# Patient Record
Sex: FEMALE | Race: WHITE
[De-identification: names, ages, dates, MRNs, and addresses within clinical notes are randomized per-mention and may not be internally consistent; named-entity substitution may affect disease eponyms.]

---

## 2018-10-21 ENCOUNTER — HOSPITAL ENCOUNTER (EMERGENCY)
Dept: HOSPITAL 62 - ER | Age: 23
LOS: 1 days | Discharge: HOME | End: 2018-10-22
Payer: OTHER GOVERNMENT

## 2018-10-21 DIAGNOSIS — O26.891: Primary | ICD-10-CM

## 2018-10-21 DIAGNOSIS — Z3A.00: ICD-10-CM

## 2018-10-21 DIAGNOSIS — R10.32: ICD-10-CM

## 2018-10-21 DIAGNOSIS — M79.605: ICD-10-CM

## 2018-10-21 LAB
APPEARANCE UR: (no result)
APTT PPP: YELLOW S
BILIRUB UR QL STRIP: NEGATIVE
GLUCOSE UR STRIP-MCNC: NEGATIVE MG/DL
KETONES UR STRIP-MCNC: (no result) MG/DL
NITRITE UR QL STRIP: NEGATIVE
PH UR STRIP: 8 [PH] (ref 5–9)
PROT UR STRIP-MCNC: NEGATIVE MG/DL
SP GR UR STRIP: 1.02
UROBILINOGEN UR-MCNC: NEGATIVE MG/DL (ref ?–2)

## 2018-10-21 PROCEDURE — 81025 URINE PREGNANCY TEST: CPT

## 2018-10-21 PROCEDURE — 96374 THER/PROPH/DIAG INJ IV PUSH: CPT

## 2018-10-21 PROCEDURE — 76817 TRANSVAGINAL US OBSTETRIC: CPT

## 2018-10-21 PROCEDURE — 81001 URINALYSIS AUTO W/SCOPE: CPT

## 2018-10-21 PROCEDURE — 99284 EMERGENCY DEPT VISIT MOD MDM: CPT

## 2018-10-21 PROCEDURE — 93976 VASCULAR STUDY: CPT

## 2018-10-21 PROCEDURE — 36415 COLL VENOUS BLD VENIPUNCTURE: CPT

## 2018-10-21 PROCEDURE — 84702 CHORIONIC GONADOTROPIN TEST: CPT

## 2018-10-21 NOTE — RADIOLOGY REPORT (SQ)
EXAM DESCRIPTION: 



US PREGNANCY TRANSVAGINAL



COMPLETED DATE/TME:  10/21/2018 22:16



CLINICAL HISTORY: 



23 years, Female, abdominal pain in pregnancy



Findings: Uterus is anteverted and measures 8.6 x 5.2 x 4.3 cm.

There is a small possible gestational sac measuring 2 mm which

may represent gestational age of five weeks. There is no fetal

pole noted. Small amount of fluid in the cervix which measures

3.2 cm in length. Maternal ovaries are within normal limits. No

abnormal adnexal masses.



IMPRESSION:



Possible early IUG of five weeks. No fetal pole identified.

Recommend follow-up beta-hCG levels.

## 2018-10-21 NOTE — ER DOCUMENT REPORT
ED General





- General


Chief Complaint: OB Problem (<20wks)


Stated Complaint: ABDOMINAL PAIN


Time Seen by Provider: 10/21/18 22:01


Notes: 


Patient is a 23-year-old female presents with complaint of some abdominal pain 

in pregnancy.  She said it started today.  Left lower quadrant.  She also has 

some pain that radiates into her leg from the left lower quadrant of her 

abdomen.  She recently found out she was pregnant.  Approximately 5 days ago 

her hCG level was 127.  This is her third pregnancy.  She has had previous C-

section with a live child.  She also had twins which resulted in a miscarriage 

and a elective .  She denies fevers.  No vaginal bleeding.  No abnormal 

vaginal discharge.  No recent trauma or injuries to the abdomen.  No other 

complaints at this time.  Patient says pain started while she was driving away 

from the store in the car.  She said she had ran into the store but the pain 

did not start until after she was sitting in her car and driving.





TRAVEL OUTSIDE OF THE [place] IN LAST 30 DAYS: No





Past Medical History





- Social History


Smoking Status: Unknown if Ever Smoked


Frequency of alcohol use: None


Drug Abuse: None


Family History: Reviewed & Not Pertinent





Review of Systems





- Review of Systems


Notes: 





My Normal Review Basic





REVIEW OF SYSTEMS:


CONSTITUTIONAL :  Denies fever,  chills, or sweats.  Denies recent illness.


RESPIRATORY:  Denies cough, cold, or chest congestion.  Denies shortness of 

breath, difficulty breathing, or wheezing.


GASTROINTESTINAL: Left lower quadrant abdominal pain..  Denies nausea, vomiting

, or diarrhea.  


GENITOURINARY:  Denies difficulty urinating, painful urination, burning, 

frequency, or blood in urine.


FEMALE  GENITOURINARY:  Denies vaginal bleeding, abnormal or irregular periods.

  LMP: Currently pregnant


MUSCULOSKELETAL: Pain radiating into left leg.


NEUROLOGICAL:  Denies sensory or motor loss.


ALL OTHER SYSTEMS REVIEWED AND NEGATIVE.





Physical Exam





- Vital signs


Vitals: 


 











Temp Pulse Resp BP Pulse Ox


 


 98.1 F   88   16   128/71 H  97 


 


 10/21/18 21:32  10/21/18 21:32  10/21/18 21:32  10/21/18 21:32  10/21/18 21:32














- Notes


Notes: 





General Appearance: Well nourished, alert, cooperative, no acute distress, mild 

obvious discomfort.


Vitals: reviewed, See vital signs table.


Eyes: PERRL, EOMI, Conjuctiva clear


Lungs: No wheezing, No rales, No rhonci, No accessory muscle use, good air 

exchange bilaterally.


Heart: Normal rate, Regular rythm, No murmur, no rub


Abdomen: Normal BS, soft, No rigidity, some pain to palpation over left lower 

quadrant of abdomen.  Remainder of abdomen is nontender.  , No guarding, no 

rebound, no abdominal masses, no organomegaly


Extremities: strength 5/5 in all extremities, good dorsalis pedis as well as 

posterior tibial pulse in the left foot.  Good capillary refill in the left 

foot.  Distal sensation intact., no swelling or tenderness in the extremities, 

no edema.


Skin: warm, dry, appropriate color, no rash


Neuro: speech clear, oriented x 3, normal affect, responds appropriately to 

questions.





Course





- Re-evaluation


Re-evalutation: 





10/22/18 05:40


Patient's ultrasound shows probable IUP.  They cannot tell for sure if there is 

IUP on present.  Patient does have left lower pain.  She is feeling some 

improved.  Urinalysis negative.  She has had no vaginal bleeding therefore 

RhoGam was not given at this time.  I informed the patient that she will need 

to return in 2 days for reevaluation and also probable repeat hCG level as well 

as repeat ultrasound being that we do not have definitive IUP as of yet.  I 

informed her if she has worsening pain or vaginal bleeding that she must return 

to ER immediately.  Patient agrees with plan and will be discharged home.





Dictation of this chart was performed using voice recognition software; 

therefore, there may be some unintended grammatical errors.





- Vital Signs


Vital signs: 


 











Temp Pulse Resp BP Pulse Ox


 


 98.3 F   72   15   94/60 L  100 


 


 10/22/18 00:22  10/22/18 00:22  10/22/18 00:22  10/22/18 00:22  10/22/18 00:22














- Laboratory


Laboratory results interpreted by me: 


 











  10/21/18 10/21/18 10/21/18





  21:41 21:41 21:41


 


Beta HCG, Quant   1333.40 H 


 


Urine Ketones    TRACE H


 


Urine HCG, Qual  POSITIVE H  














Discharge





- Discharge


Clinical Impression: 


Abdominal pain during pregnancy


Qualifiers:


 Trimester: first trimester Qualified Code(s): O26.891 - Other specified 

pregnancy related conditions, first trimester





Condition: Good


Disposition: HOME, SELF-CARE


Additional Instructions: 


Please take tylenol 650mg every 4 hours for pain. As discussed with you, the 

radiologist cannot 100% see a pregnancy as of yet, but sees some evidence of a 

developing pregnancy. Because of your pain we want you to return to the ER in 2 

days for recheck of your pregnancy hormone level and repeat ultrasound. please 

return to the ER immediately if you have sudden worsening pain, fevers, vaginal 

bleeding, leg weakness, or feel that you are worsening in any way. Please avoid 

heavy lifting and sexual activity.

## 2018-10-22 VITALS — SYSTOLIC BLOOD PRESSURE: 94 MMHG | DIASTOLIC BLOOD PRESSURE: 60 MMHG

## 2018-10-23 ENCOUNTER — HOSPITAL ENCOUNTER (EMERGENCY)
Dept: HOSPITAL 62 - ER | Age: 23
Discharge: HOME | End: 2018-10-23
Payer: OTHER GOVERNMENT

## 2018-10-23 VITALS — DIASTOLIC BLOOD PRESSURE: 81 MMHG | SYSTOLIC BLOOD PRESSURE: 125 MMHG

## 2018-10-23 DIAGNOSIS — M79.606: ICD-10-CM

## 2018-10-23 DIAGNOSIS — R10.2: ICD-10-CM

## 2018-10-23 DIAGNOSIS — Z88.8: ICD-10-CM

## 2018-10-23 DIAGNOSIS — Z3A.01: ICD-10-CM

## 2018-10-23 DIAGNOSIS — O99.89: ICD-10-CM

## 2018-10-23 DIAGNOSIS — O26.891: Primary | ICD-10-CM

## 2018-10-23 PROCEDURE — 99284 EMERGENCY DEPT VISIT MOD MDM: CPT

## 2018-10-23 PROCEDURE — 36415 COLL VENOUS BLD VENIPUNCTURE: CPT

## 2018-10-23 PROCEDURE — 84702 CHORIONIC GONADOTROPIN TEST: CPT

## 2018-10-23 NOTE — ER DOCUMENT REPORT
ED General





- General


Chief Complaint: OB Problem (<20wks)


Stated Complaint: ABDOMINAL PAIN


Time Seen by Provider: 10/23/18 12:10


Notes: 





Patient is a 23-year-old female, that is approximately 5 weeks gravid that 

presents to the emergency department for chief complaint of bilateral pelvic 

cramping.  Patient was seen 2 days ago for similar complaints, advised to 

return in 2 days to, be reevaluated.  She still having the bilateral cramping, 

describes it as an aching sensation, occasionally sharp, worse with cough.  She 

has had pain in her leg as well, that seems to be worse with coughing or 

sneezing, this started after she ran to the grocery store.  She denies any 

falls or injuries.  Denies any numbness, to or weakness.  Denies any dysuria or 

hematuria.





Past Medical History: Denies chronic medical conditions


Past Surgical History: 


Social History: Denies tobacco, alcohol or drug use


Family History: Reviewed and noncontributory for presenting illness


Allergies: Reviewed, see documented allergy list. 





REVIEW OF SYSTEMS:


Unless otherwise stated in this report the patient's positive and negative 

responses for review of systems for constitutional, eyes, ENT, cardiovascular, 

respiratory, gastrointestinal, neurological, genitourinary, musculoskeletal, 

and integumentary systems and related systems to the presenting problem are 

either as stated in the HPI or were not pertinent or were negative for the 

symptoms and/or complaints related to the presenting medical problem.





PHYSICAL EXAMINATION:





Vital signs reviewed, nursing noted reviewed. 





GENERAL: Well-appearing, well-nourished and in no acute distress.





HEAD: Atraumatic, normocephalic.





EYES: Eyes appear normal, extraocular movements intact, sclera anicteric, 

conjunctiva are normal.





ENT: nares patent, oropharynx clear without exudates.  Moist mucous membranes.





NECK: Normal range of motion, supple without lymphadenopathy





LUNGS: Breath sounds clear to auscultation bilaterally and equal.  No wheezes 

rales or rhonchi.





HEART: Regular rate and rhythm without murmurs





ABDOMEN: Soft, nontender, normoactive bowel sounds.  No rebound, guarding, or 

rigidity. No masses appreciated.





EXTREMITIES: Nontender, good range of motion, no pitting or edema.  





NEUROLOGICAL: No focal neurological deficits. Moves all extremities 

spontaneously Motor and sensory grossly intact on exam.





PSYCH: Normal mood, normal affect.





SKIN: Warm, Dry, normal turgor, no rashes or lesions noted on exposed skin





TRAVEL OUTSIDE OF THE [place] IN LAST 30 DAYS: No





- Related Data


Allergies/Adverse Reactions: 


 





benzonatate [From Tesmax Carrion] Allergy (Verified 10/23/18 11:47)


 


NSAIDS (Non-Steroidal Anti-Inflamma Allergy (Verified 10/23/18 11:47)


 











Past Medical History





- General


Last Menstrual Period: 9/15/18





- Social History


Smoking Status: Unknown if Ever Smoked


Chew tobacco use (# tins/day): No


Frequency of alcohol use: None


Drug Abuse: None


Family History: Reviewed & Not Pertinent


Patient has suicidal ideation: No


Patient has homicidal ideation: No


Renal/ Medical History: Denies: Hx Peritoneal Dialysis


Past Surgical History: Reports: Hx  Section





Physical Exam





- Vital signs


Vitals: 


 











Temp Pulse Resp BP Pulse Ox


 


 98 F   84   16   116/70   100 


 


 10/23/18 11:51  10/23/18 11:51  10/23/18 11:51  10/23/18 11:51  10/23/18 11:51














Course





- Re-evaluation


Re-evalutation: 





Patient seen and examined vital signs reviewed. 





Laboratory data and imaging were ordered as appropriate for the patient's 

presenting symptoms and complaint, with consideration of any critical or life 

threatening conditions that may be associated with their obtained history and 

exam as noted above.





Results were reviewed when available and demonstrated hCG that doubled from 2 

days ago, consistent with advancing pregnancy, patient was advised to repeat an 

ultrasound in several weeks, and to follow-up with women's health, and to take 

Tylenol as needed for her pain.





The patient was re-evaluated and was stable, no acute distress or pain





Evaluation was most consistent with early pregnancy, abdominal cramping, and 

progressing pregnancy, prior ultrasound was reviewed, demonstrated early IUP.





Results were discussed with the patient at this point, after careful 

consideration I feel that that patient can be discharged from the emergency 

department, the patient was educated treatments and reasons to return to the 

emergency department based on their presumed diagnosis as noted above, they 

were advised to followup with a primary care physician in 2-3 days. Patient was 

agreeable to plan of care.





*Note is created using voice recognition software and may contain spelling, 

syntax or grammatical errors.








Laboratory











  10/23/18





  12:30


 


Beta HCG, Quant  2635.60 H


 


Total Beta HCG  POSITIVE

















- Vital Signs


Vital signs: 


 











Temp Pulse Resp BP Pulse Ox


 


 98 F   84   16   116/70   100 


 


 10/23/18 11:51  10/23/18 11:51  10/23/18 11:51  10/23/18 11:51  10/23/18 11:51














- Laboratory


Laboratory results interpreted by me: 


 











  10/23/18





  12:30


 


Beta HCG, Quant  2635.60 H














Discharge





- Discharge


Clinical Impression: 


Pregnancy


Qualifiers:


 Weeks of gestation: less than 8 weeks Qualified Code(s): Z3A.01 - Less than 8 

weeks gestation of pregnancy





Condition: Stable


Disposition: HOME, SELF-CARE


Instructions:  Pregnancy ([facility]), Pelvic Pain in Pregnancy ([facility])


Referrals: 


JUAN J MORENO PA-C [Primary Care Provider] - Follow up in 3-5 days


[facility] [Provider Group] - Follow up tomorrow

## 2018-10-30 ENCOUNTER — HOSPITAL ENCOUNTER (EMERGENCY)
Dept: HOSPITAL 62 - ER | Age: 23
LOS: 1 days | Discharge: HOME | End: 2018-10-31
Payer: OTHER GOVERNMENT

## 2018-10-30 ENCOUNTER — HOSPITAL ENCOUNTER (EMERGENCY)
Dept: HOSPITAL 62 - ER | Age: 23
Discharge: HOME | End: 2018-10-30
Payer: OTHER GOVERNMENT

## 2018-10-30 VITALS — SYSTOLIC BLOOD PRESSURE: 107 MMHG | DIASTOLIC BLOOD PRESSURE: 69 MMHG

## 2018-10-30 DIAGNOSIS — O26.891: ICD-10-CM

## 2018-10-30 DIAGNOSIS — R10.9: ICD-10-CM

## 2018-10-30 DIAGNOSIS — Z3A.00: ICD-10-CM

## 2018-10-30 DIAGNOSIS — Z3A.01: ICD-10-CM

## 2018-10-30 DIAGNOSIS — O46.91: Primary | ICD-10-CM

## 2018-10-30 DIAGNOSIS — O20.0: Primary | ICD-10-CM

## 2018-10-30 PROCEDURE — 96374 THER/PROPH/DIAG INJ IV PUSH: CPT

## 2018-10-30 PROCEDURE — 36415 COLL VENOUS BLD VENIPUNCTURE: CPT

## 2018-10-30 PROCEDURE — 96375 TX/PRO/DX INJ NEW DRUG ADDON: CPT

## 2018-10-30 PROCEDURE — S0119 ONDANSETRON 4 MG: HCPCS

## 2018-10-30 PROCEDURE — 99284 EMERGENCY DEPT VISIT MOD MDM: CPT

## 2018-10-30 PROCEDURE — 96361 HYDRATE IV INFUSION ADD-ON: CPT

## 2018-10-30 PROCEDURE — 84702 CHORIONIC GONADOTROPIN TEST: CPT

## 2018-10-30 PROCEDURE — 93976 VASCULAR STUDY: CPT

## 2018-10-30 PROCEDURE — 76817 TRANSVAGINAL US OBSTETRIC: CPT

## 2018-10-30 PROCEDURE — 86900 BLOOD TYPING SEROLOGIC ABO: CPT

## 2018-10-30 PROCEDURE — 86901 BLOOD TYPING SEROLOGIC RH(D): CPT

## 2018-10-30 PROCEDURE — 86850 RBC ANTIBODY SCREEN: CPT

## 2018-10-30 PROCEDURE — 85025 COMPLETE CBC W/AUTO DIFF WBC: CPT

## 2018-10-30 PROCEDURE — 96372 THER/PROPH/DIAG INJ SC/IM: CPT

## 2018-10-30 NOTE — RADIOLOGY REPORT (SQ)
EXAM DESCRIPTION:  U/S OB TRANSVAG W/DOPPLER



COMPLETED DATE/TIME:  10/30/2018 5:01 pm



REASON FOR STUDY:  6 weeks pregnant, started bleeding since this morn



COMPARISON:  10/21/2018



TECHNIQUE:  Transvaginal static and realtime grayscale images acquired of the pelvis. Additional Vic
cted spectral and color Doppler images recorded. All images stored on PACs.

CLINICAL AGE: 6 weeks 3 days

[id number]



LIMITATIONS:  None.



FINDINGS:  UTERUS: No masses.  No anomalies.

GESTATIONAL SAC: Visualized

YOLK SAC: Visualized

FETAL POLE: Fetal pole is visualized.  No cardiac activity is identified.

RIGHT ADNEXA: Normal ovary with normal vascular flow.

No adnexal free fluid.

Small cyst is identified measuring 2.3 x 1.7 x 1.0 cm most consistent with a corpus lutein cyst

LEFT ADNEXA: Normal ovary with normal vascular flow.

No adnexal free fluid.

No adnexal masses.

FREE FLUID: Not visualized

OTHER: Small subchorionic bleed is identified.



IMPRESSION:  Fetal pole is identified without cardiac activity being identified.

CONSIDER F/U BHCG AND/OR ULTRASOUND FOR VERIFICATION of a living IUP.

Trimester of pregnancy:  First - 0 to 13 weeks.



TECHNICAL DOCUMENTATION:  JOB ID:  [id number]

  [employer]- All Rights Reserved



Reading location - IP/workstation name: [email or web address]

## 2018-10-30 NOTE — ER DOCUMENT REPORT
ED GI/





- General


Chief Complaint: OB Problem (<20wks)


Stated Complaint: VAGINAL BLEEDING


Time Seen by Provider: 10/30/18 15:40


Mode of Arrival: Ambulatory


Information source: Patient


Notes: 





Chief complaint: Vaginal bleeding








History of complain:( obtained from----patient) 23 years old female, 6 weeks 

pregnant, was having abdominal cramps since this morning and had a episode of 

vaginal bleeding.  Therefore present to the ED.  Nauseous but no vomiting.  

Denies any dizziness.  Denies any other constitutional symptoms





She is a , 1 








Onset: Today


Duration: One day


Severity: Mild to moderate


Quality: Crampy


Context: Pregnancy


Exacerbating factor and relieving factors: None











REVIEW OF SYSTEMS:


CONSTITUTIONAL :  Denies fever,  chills, or sweats.  Denies recent illness.


EENT:   Denies eye, ear, throat, or mouth pain or symptoms.  Denies nasal or 

sinus congestion or discharge.  Denies throat, tongue, or mouth swelling or 

difficulty swallowing.


CARDIOVASCULAR:  Denies chest pain.  Denies palpitations or racing or irregular 

heart beat.  Denies ankle edema.


RESPIRATORY:  Denies cough, cold, or chest congestion.  Denies shortness of 

breath, difficulty breathing, or wheezing.


GASTROINTESTINAL:  Denies  distention.  Denies nausea, vomiting, or diarrhea.  

Denies blood in vomitus, stools, or per rectum.  Denies black, tarry stools.  

Denies constipation. 


GENITOURINARY:  Denies difficulty urinating, painful urination, burning, 

frequency, blood in urine, or discharge.


FEMALE  GENITOURINARY:  Denies vaginal bleeding, heavy or abnormal periods, 

irregular periods.  Denies vaginal discharge or odor. 


MUSCULOSKELETAL:  Denies back or neck pain or stiffness.  Denies joint pain or 

swelling.


SKIN:   Denies rash, lesions or sores.


HEMATOLOGIC :   Denies easy bruising or bleeding.


LYMPHATIC:  Denies swollen, enlarged glands.


NEUROLOGICAL:  Denies confusion or altered mental status.  Denies passing out 

or loss of consciousness.  Denies dizziness or lightheadedness.  Denies 

headache.  Denies weakness or paralysis or loss of use of either side.  Denies 

problems with gait or speech.  Denies sensory loss, numbness, or tingling.  

Denies seizures.


PSYCHIATRIC:  Denies anxiety or stress.  Denies depression, suicidal ideation, 

or homicidal ideation.





ALL OTHER SYSTEMS REVIEWED AND NEGATIVE.











PHYSICAL EXAMINATION:





GENERAL: Well-appearing, well-nourished and in no acute distress.





HEAD: Atraumatic, normocephalic.





EYES: Pupils equal round and reactive to light, extraocular movements intact, 

conjunctiva are normal.





ENT: Nares patent, oropharynx clear without exudates.  Moist mucous membranes.





NECK: Normal range of motion, supple without lymphadenopathy





LUNGS: Breath sounds clear to auscultation bilaterally and equal.  No wheezes 

rales or rhonchi.





HEART: Regular rate and rhythm without murmurs





ABDOMEN: Soft, nontender, nondistended abdomen.  No guarding, no rebound.  No 

masses appreciated.





Examination of genitals-deferred





Musculoskeletal: Normal range of motion, no pitting or edema.  No cyanosis.





NEUROLOGICAL: Cranial nerves grossly intact.  Normal speech, normal gait.  

Normal sensory, motor exams





PSYCH: Normal mood, normal affect.





SKIN: Warm, Dry, normal turgor, no rashes or lesions noted.

















Dictation was performed using Dragon voice recognition software


TRAVEL OUTSIDE OF THE [place] IN LAST 30 DAYS: No





- HPI


Notes: 





10/30/18 15:42





Dictated





- Related Data


Allergies/Adverse Reactions: 


 





benzonatate [From Tessalon Perles] Allergy (Verified 10/30/18 15:26)


 


NSAIDS (Non-Steroidal Anti-Inflamma Allergy (Verified 10/30/18 15:26)


 











Past Medical History





- Social History


Smoking Status: Never Smoker


Chew tobacco use (# tins/day): No


Frequency of alcohol use: None


Drug Abuse: None


Lives with: Family


Family History: Reviewed & Not Pertinent


Patient has suicidal ideation: No


Patient has homicidal ideation: No


Renal/ Medical History: Denies: Hx Peritoneal Dialysis


Past Surgical History: Reports: Hx  Section





Review of Systems





- Review of Systems


Notes: 








Dictated





Physical Exam





- Vital signs


Vitals: 


 











Temp Pulse Resp BP Pulse Ox


 


 98.4 F   98   20   118/62   97 


 


 10/30/18 15:30  10/30/18 15:30  10/30/18 15:30  10/30/18 15:30  10/30/18 15:30














- Notes


Notes: 








Dictated





Course





- Vital Signs


Vital signs: 


 











Temp Pulse Resp BP Pulse Ox


 


 98.4 F   98   20   118/62   97 


 


 10/30/18 15:30  10/30/18 15:30  10/30/18 15:30  10/30/18 15:30  10/30/18 15:30














- Laboratory


Laboratory results interpreted by me: 


 











  10/30/18





  15:49


 


Beta HCG, Quant  85621.00 H














- Diagnostic Test


Radiology reviewed: Reports reviewed - Ultrasound was reported by radiologist 

as fetal pole is visualized but no cardiac activity.





Discharge





- Discharge


Clinical Impression: 


 Threatened  affecting intrauterine pregnancy





Condition: Fair


Instructions:  Threatened Miscarriage ([facility])


Referrals: 


JUAN J MORENO PA-C [Primary Care Provider] - Follow up as needed

## 2018-10-31 VITALS — DIASTOLIC BLOOD PRESSURE: 69 MMHG | SYSTOLIC BLOOD PRESSURE: 110 MMHG

## 2018-10-31 LAB
ADD MANUAL DIFF: NO
BASOPHILS # BLD AUTO: 0.1 10^3/UL (ref 0–0.2)
BASOPHILS NFR BLD AUTO: 1 % (ref 0–2)
EOSINOPHIL # BLD AUTO: 0.2 10^3/UL (ref 0–0.6)
EOSINOPHIL NFR BLD AUTO: 2 % (ref 0–6)
ERYTHROCYTE [DISTWIDTH] IN BLOOD BY AUTOMATED COUNT: 12.9 % (ref 11.5–14)
HCT VFR BLD CALC: 37.5 % (ref 36–47)
HGB BLD-MCNC: 12.9 G/DL (ref 12–15.5)
LYMPHOCYTES # BLD AUTO: 2.7 10^3/UL (ref 0.5–4.7)
LYMPHOCYTES NFR BLD AUTO: 26.2 % (ref 13–45)
MCH RBC QN AUTO: 29.7 PG (ref 27–33.4)
MCHC RBC AUTO-ENTMCNC: 34.4 G/DL (ref 32–36)
MCV RBC AUTO: 86 FL (ref 80–97)
MONOCYTES # BLD AUTO: 0.8 10^3/UL (ref 0.1–1.4)
MONOCYTES NFR BLD AUTO: 8.2 % (ref 3–13)
NEUTROPHILS # BLD AUTO: 6.4 10^3/UL (ref 1.7–8.2)
NEUTS SEG NFR BLD AUTO: 62.6 % (ref 42–78)
PLATELET # BLD: 259 10^3/UL (ref 150–450)
RBC # BLD AUTO: 4.35 10^6/UL (ref 3.72–5.28)
TOTAL CELLS COUNTED % (AUTO): 100 %
WBC # BLD AUTO: 10.2 10^3/UL (ref 4–10.5)

## 2018-10-31 NOTE — ER DOCUMENT REPORT
ED GI/





- General


Chief Complaint: vaginal bleding


Stated Complaint: ABDOMINAL CRAMPING/VAGINAL BLEEDING


Time Seen by Provider: 10/31/18 00:05


Notes: 


Patient is a 23-year-old female that comes to the emergency department for 

chief complaint of vaginal bleeding and lower abdominal cramping.  She states 

she is estimated to be 6 weeks pregnant by last menstrual period, she was seen 

here earlier today and had an ultrasound that showed a fetal pole but no 

cardiac activity.  She states that since she went home her bleeding has 

increased and her cramping has increased.  She has not passed any tissue.  She 

is  with one emergency  and one elective .  Only 

medication is prenatal vitamins.





TRAVEL OUTSIDE OF THE [place] IN LAST 30 DAYS: No





- Related Data


Allergies/Adverse Reactions: 


 





benzonatate [From TesOctapoly] Allergy (Verified 10/30/18 15:26)


 


NSAIDS (Non-Steroidal Anti-Inflamma Allergy (Verified 10/30/18 15:26)


 











Past Medical History





- General


Information source: Patient





- Social History


Smoking Status: Never Smoker


Frequency of alcohol use: None


Drug Abuse: None


Lives with: Family


Family History: Reviewed & Not Pertinent


Patient has suicidal ideation: No


Patient has homicidal ideation: No





- Medical History


Medical History: Negative


Renal/ Medical History: Denies: Hx Peritoneal Dialysis


Past Surgical History: Reports: Hx  Section





- Immunizations


Immunizations up to date: Yes


Hx Diphtheria, Pertussis, Tetanus Vaccination: Yes





Review of Systems





- Review of Systems


Constitutional: No symptoms reported


EENT: No symptoms reported


Cardiovascular: No symptoms reported


Respiratory: No symptoms reported


Gastrointestinal: See HPI


Genitourinary: No symptoms reported


Female Genitourinary: See HPI


Musculoskeletal: No symptoms reported


Skin: No symptoms reported


Hematologic/Lymphatic: No symptoms reported


Neurological/Psychological: No symptoms reported





Physical Exam





- Vital signs


Vitals: 


 











Temp Pulse Resp BP Pulse Ox


 


 98 F   71   18   117/56 L  99 


 


 10/30/18 23:35  10/30/18 23:35  10/30/18 23:35  10/30/18 23:35  10/30/18 23:35














- Notes


Notes: 





GENERAL: Alert, interacts well. No acute distress.


HEAD: Normocephalic, atraumatic.


EYES: Pupils equal, round, and reactive to light. Extraocular movements intact.


ENT: Oral mucosa moist, tongue midline. Oropharynx unremarkable. Airway patent. 

Nares patent, no nasal septal hematoma, TM's intact.


NECK: Full range of motion. Supple. Trachea midline.


LUNGS: Clear to auscultation bilaterally, no wheezes, rales, or rhonchi. No 

respiratory distress.


HEART: Regular rate and rhythm. No murmur


ABDOMEN: Soft, non-tender. Non-distended. Bowel sounds present in all 4 

quadrants.


GENITOURINARY: Deferred


EXTREMITIES: Moves all 4 extremities spontaneously. No edema, normal radial and 

dorsalis pedis pulses bilaterally. No cyanosis.


BACK: no cervical, thoracic, lumbar midline tenderness. No saddle anesthesia, 

normal distal neurovascular exam. 


NEUROLOGICAL: Alert and oriented x3. Normal speech. [cranial nerves II through 

XII grossly intact]. 


PSYCH: Normal affect, normal mood.


SKIN: Warm, dry, normal turgor. No rashes or lesions noted.





Course





- Re-evaluation


Re-evalutation: 


Well-appearing patient.  Nontender abdomen.  No current severe bleeding.  

Unremarkable vital signs without hypotension.





Cough without anemia.  HCG is actually elevating, previous was 30,000, now 36,

000.  Not consistent with miscarriage.  Patient had an ultrasound within the 

past 12 hours that showed intrauterine pregnancy although this did not show 

definite heartbeat.  Patient was discussed with Dr. Spain.  No additional 

recommendations at this time.  Patient was given RhoGam because this was 

indicated, discussed precautions, follow-up, and return precautions.  Patient 

states satisfaction and agreement with plan.





- Vital Signs


Vital signs: 


 











Temp Pulse Resp BP Pulse Ox


 


 98.2 F   73   18   110/69   100 


 


 10/31/18 02:18  10/31/18 02:18  10/31/18 02:18  10/31/18 02:18  10/31/18 02:18














- Laboratory


Result Diagrams: 


 10/31/18 00:29





Laboratory results interpreted by me: 


 











  10/31/18





  00:29


 


Beta HCG, Quant  25935.00 H














Discharge





- Discharge


Clinical Impression: 


 Vaginal bleeding affecting early pregnancy





Condition: Stable


Disposition: HOME, SELF-CARE


Additional Instructions: 


Your ultrasound earlier showed a pregnancy in the uterus, your pregnancy 

hormone is still elevating.


Your current situation will require close follow-up.  Please follow-up closely 

with OB/GYN for additional monitoring.  Either follow your appointment or call 

the listed referral as directed.  Perform pelvic rest (no running, jumping, 

heavy lifting, sexual intercourse, etc.).





Return for any concerning symptoms including heavy bleeding with dizziness, 

passing out, severe pain, or any other concerning symptoms.


Referrals: 


[facility] [Provider Group] - 18

## 2019-01-26 ENCOUNTER — HOSPITAL ENCOUNTER (EMERGENCY)
Dept: HOSPITAL 62 - ER | Age: 24
Discharge: HOME | End: 2019-01-26
Payer: COMMERCIAL

## 2019-01-26 VITALS — DIASTOLIC BLOOD PRESSURE: 68 MMHG | SYSTOLIC BLOOD PRESSURE: 119 MMHG

## 2019-01-26 DIAGNOSIS — M25.531: ICD-10-CM

## 2019-01-26 DIAGNOSIS — S63.501A: ICD-10-CM
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
## 2019-01-26 DIAGNOSIS — R07.81: ICD-10-CM

## 2019-01-26 DIAGNOSIS — S16.1XXA: Primary | ICD-10-CM

## 2019-01-26 DIAGNOSIS — M54.2: ICD-10-CM

## 2019-01-26 PROCEDURE — 71101 X-RAY EXAM UNILAT RIBS/CHEST: CPT

## 2019-01-26 PROCEDURE — 72125 CT NECK SPINE W/O DYE: CPT

## 2019-01-26 PROCEDURE — L3908 WHO COCK-UP NONMOLDE PRE OTS: HCPCS

## 2019-01-26 PROCEDURE — 73110 X-RAY EXAM OF WRIST: CPT

## 2019-01-26 PROCEDURE — 99284 EMERGENCY DEPT VISIT MOD MDM: CPT
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
## 2019-01-29 ENCOUNTER — HOSPITAL ENCOUNTER (EMERGENCY)
Dept: HOSPITAL 62 - ER | Age: 24
Discharge: LEFT BEFORE DISCHARGE | End: 2019-01-29
Payer: COMMERCIAL

## 2019-01-29 VITALS — DIASTOLIC BLOOD PRESSURE: 64 MMHG | SYSTOLIC BLOOD PRESSURE: 111 MMHG
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
## 2019-01-29 DIAGNOSIS — Z53.21: Primary | ICD-10-CM

## 2019-06-02 ENCOUNTER — HOSPITAL ENCOUNTER (EMERGENCY)
Dept: HOSPITAL 62 - ER | Age: 24
Discharge: HOME | End: 2019-06-02
Payer: OTHER GOVERNMENT

## 2019-06-02 VITALS — SYSTOLIC BLOOD PRESSURE: 105 MMHG | DIASTOLIC BLOOD PRESSURE: 67 MMHG

## 2019-06-02 DIAGNOSIS — M79.645: ICD-10-CM
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
## 2019-06-02 DIAGNOSIS — S69.92XA: Primary | ICD-10-CM

## 2019-06-02 PROCEDURE — 99283 EMERGENCY DEPT VISIT LOW MDM: CPT

## 2019-06-02 NOTE — RADIOLOGY REPORT (SQ)
EXAM DESCRIPTION:  FINGER LEFT



COMPLETED DATE/TIME:  6/2/2019 5:39 pm



REASON FOR STUDY:  finger injury, pain



COMPARISON:  None.



NUMBER OF VIEWS:  Three views.



TECHNIQUE:  AP, lateral, and oblique images acquired of the left second finger.



LIMITATIONS:  None.



FINDINGS:  MINERALIZATION: Normal.

BONES: No acute fracture or dislocation.  No worrisome bone lesions.

SOFT TISSUES: Mild soft tissue swelling.  No foreign body.

OTHER: No other significant finding.



IMPRESSION:  No fracture.



TECHNICAL DOCUMENTATION:  JOB ID:  [id number]

 2011 [employer]- All Rights Reserved



Reading location - IP/workstation name: [email or web address]

## 2019-06-02 NOTE — ER DOCUMENT REPORT
HPI





- HPI


Patient complains to provider of: finger pain


Time Seen by Provider: 19 17:19


Onset: Just prior to arrival


Onset/Duration: Sudden


Severity: Severe


Pain Level: 4


Context: 





Patient presents emergency department with complaints of left index finger pain.

 Patient reports she dropped a 4 pack can of corn on her finger prior to 

arrival.  Reports the area really does not hurt is more numb than anything.  

Patient is able to flex and extend her finger.  Good cap refill.  Patient is 

right-hand dominant


Associated Symptoms: None


Exacerbated by: Denies


Relieved by: Denies


Similar symptoms previously: No


Recently seen / treated by doctor: No





- REPRODUCTIVE


Reproductive: DENIES: Pregnant:





Past Medical History





- General


Information source: Patient


Last Menstrual Period: 19





- Social History


Smoking Status: Unknown if Ever Smoked


Cigarette use (# per day): No


Frequency of alcohol use: Occasional


Drug Abuse: None


Lives with: Family


Family History: Reviewed & Not Pertinent


Patient has suicidal ideation: No


Patient has homicidal ideation: No


Renal/ Medical History: Denies: Hx Peritoneal Dialysis


GI Medical History: Reports: Other - Celiac


Past Surgical History: Reports: Hx  Section





- Immunizations


Immunizations up to date: Yes


Hx Diphtheria, Pertussis, Tetanus Vaccination: Yes





Vertical Provider Document





- CONSTITUTIONAL


Agree With Documented VS: Yes


Exam Limitations: No Limitations


General Appearance: WD/WN, No Apparent Distress





- INFECTION CONTROL


TRAVEL OUTSIDE OF THE [place] IN LAST 30 DAYS: No





- HEENT


HEENT: Atraumatic, Normocephalic





- NECK


Neck: Supple





- RESPIRATORY


Respiratory: No Respiratory Distress





- CARDIOVASCULAR


Cardiovascular: Regular Rate





- MUSCULOSKELETAL/EXTREMETIES


Musculoskeletal/Extremeties: MAEW, FROM, Tender - Left index finger tender to 

palpation proximal phalange with some ecchymosis, no obvious deformity, good cap

refill, patient able to make a strong 





- NEURO


Level of Consciousness: Awake, Alert, Appropriate


Motor/Sensory: No Motor Deficit





- DERM


Integumentary: Warm, Dry





Course





- Re-evaluation


Re-evalutation: 





19 18:03


Negative x-ray no acute fracture.  Patient placed in finger splint for 

protection and comfort.  Instructed to take Tylenol for pain ice elevate finger.

 She verbalized understanding all instructions.











Dictation of this chart was performed using voice recognition software; 

therefore, there may be some unintended grammatical errors.





- Vital Signs


Vital signs: 


                                        











Temp Pulse Resp BP Pulse Ox


 


 98.5 F   69   18   125/64   100 


 


 19 16:40  19 16:40  19 16:40  19 16:40  19 16:40














- Diagnostic Test


Radiology reviewed: Image reviewed, Reports reviewed - negative for acute 

fracture





Procedures





- Immobilization


  ** Left 2nd digit


Pre-Proc Neuro Vasc Exam: Normal


Immobilizer type: Finger splint (Static)


Performed by: PCT


Post-Proc Neuro Vasc Exam: Unchanged from pre-exam


Alignment checked and good: Yes





Discharge





- Discharge


Clinical Impression: 


Injury of left index finger


Qualifiers:


 Encounter type: initial encounter Qualified Code(s): S69.92XA - Unspecified 

injury of left wrist, hand and finger(s), initial encounter





Condition: Stable


Disposition: HOME, SELF-CARE


Instructions:  Acetaminophen, Ice & Elevation ([facility]), Temporary Splint ([facility])


Additional Instructions: 


*You have been evaluated for a finger injury


*The x-ray was negative for an acute injury


*Rest/Ice/Elevate your finger, maintain the splint for comfort


*Follow up with orthopedics for continued pain within 1 week for recheck  


*Take Tylenol as indicated for pain


*Return to ED for worsening condition, changes, needs, increased pain, concerns





Referrals: 


JUAN J MORENO PA-C [Primary Care Provider] - Follow up in 3-5 days

## 2020-12-17 ENCOUNTER — HOSPITAL ENCOUNTER (EMERGENCY)
Dept: HOSPITAL 62 - ER | Age: 25
LOS: 1 days | Discharge: LEFT BEFORE DISCHARGE | End: 2020-12-18
Payer: OTHER GOVERNMENT

## 2020-12-17 VITALS — DIASTOLIC BLOOD PRESSURE: 78 MMHG | SYSTOLIC BLOOD PRESSURE: 121 MMHG

## 2020-12-17 DIAGNOSIS — O26.899: Primary | ICD-10-CM

## 2020-12-17 DIAGNOSIS — Z3A.00: ICD-10-CM

## 2020-12-17 DIAGNOSIS — Z88.8: ICD-10-CM

## 2020-12-17 DIAGNOSIS — O99.891: ICD-10-CM

## 2020-12-17 DIAGNOSIS — Z53.20: ICD-10-CM

## 2020-12-17 DIAGNOSIS — Z87.59: ICD-10-CM

## 2020-12-17 DIAGNOSIS — R10.9: ICD-10-CM

## 2020-12-17 DIAGNOSIS — M54.5: ICD-10-CM

## 2020-12-17 PROCEDURE — 99281 EMR DPT VST MAYX REQ PHY/QHP: CPT

## 2020-12-17 PROCEDURE — 36415 COLL VENOUS BLD VENIPUNCTURE: CPT

## 2020-12-17 PROCEDURE — 96374 THER/PROPH/DIAG INJ IV PUSH: CPT

## 2020-12-17 PROCEDURE — 85025 COMPLETE CBC W/AUTO DIFF WBC: CPT

## 2020-12-17 PROCEDURE — 96361 HYDRATE IV INFUSION ADD-ON: CPT

## 2020-12-17 PROCEDURE — 80053 COMPREHEN METABOLIC PANEL: CPT

## 2020-12-17 PROCEDURE — 84702 CHORIONIC GONADOTROPIN TEST: CPT

## 2020-12-17 NOTE — ER DOCUMENT REPORT
ED Medical Screen (RME)





- General


Chief Complaint: Flank Pain


Stated Complaint: LEFT SIDE PAIN, VOMITING


Time Seen by Provider: 20 23:00


Primary Care Provider: 


JUAN J MORENO PA-C [Primary Care Provider] - Follow up as needed


Mode of Arrival: Wheelchair


Information source: Patient


Notes: 





Patient is a 25-year-old female comes emergency room complaining of left-sided 

flank and low back pain with some slight abdominal pain.  Patient states that 

she is approximately 6 weeks gestation.  Last menstrual period was on  

and she has had a history of pregnancies of being  5 para 2 with 2 

spontaneous miscarriages.  Patient states she started with pain yesterday 

afternoon with vomiting the past 24 hours are stopped.  She is recently taken 

Zofran without much relief and the pain is seems to be increasing.





Physical examination shows a well-nourished well-developed 45-year-old female no

apparent distress but does appear uncomfortable.


Cardiac: Patient displays a regular rate and rhythm on monitor of 69 beats a 

minute no murmurs auscultated.


Lungs: Bilateral breath sounds increased clear auscultation sats 100%.


Abdomen: Patient does display bowel sounds present all 4 quadrants in a sitting 

position with some mild tenderness to palpation on the left lower side.  Also 

some mild left-sided flank pain.





Given the patient has a history of miscarriages we will go ahead and get an 

ultrasound although she is not spotting at this time.  Holding off on any type 

of a renal ultrasound no history of kidney stones.











I have greeted and performed a rapid initial assessment of this patient.  A 

comprehensive ED assessment and evaluation of the patient, analysis of test 

results and completion of the medical decision making process will be conducted 

by additional ED providers.  Dictation of this chart was performed using voice 

recognition software; therefore, there may be some unintended grammatical 

errors.


TRAVEL OUTSIDE OF THE [place] IN LAST 30 DAYS: No





- HPI


Onset: Yesterday


Onset/Duration: Gradual, Persistent, Worse


Quality of pain: Cramping


Severity: Severe


Pain Level: 4





- Related Data


Allergies/Adverse Reactions: 


                                        





benzonatate [From Tessalon Perles] Allergy (Verified 19 16:32)


   


NSAIDS (Non-Steroidal Anti-Inflamma Allergy (Verified 19 16:32)


   











Past Medical History


Renal/ Medical History: Denies: Hx Peritoneal Dialysis


Past Surgical History: Reports: Hx  Section





- Immunizations


Immunizations up to date: Yes


Hx Diphtheria, Pertussis, Tetanus Vaccination: Yes





Physical Exam





- Vital signs


Vitals: 





                                        











Temp Pulse Resp BP Pulse Ox


 


 98.2 F   69   16   121/78   100 


 


 20 23:00  20 23:00  20 23:00  20 23:00  20 23:00














Course





- Vital Signs


Vital signs: 





                                        











Temp Pulse Resp BP Pulse Ox


 


 98.2 F   69   16   121/78   100 


 


 20 23:00  20 23:00  20 23:00  20 23:00  20 23:00














Doctor's Discharge





- Discharge


Referrals: 


JUAN J MORENO PA-C [Primary Care Provider] - Follow up as needed

## 2020-12-18 LAB
ADD MANUAL DIFF: NO
ALBUMIN SERPL-MCNC: 5.1 G/DL (ref 3.5–5)
ALP SERPL-CCNC: 90 U/L (ref 38–126)
ANION GAP SERPL CALC-SCNC: 11 MMOL/L (ref 5–19)
AST SERPL-CCNC: 22 U/L (ref 14–36)
BASOPHILS # BLD AUTO: 0.1 10^3/UL (ref 0–0.2)
BASOPHILS NFR BLD AUTO: 0.4 % (ref 0–2)
BILIRUB DIRECT SERPL-MCNC: 0 MG/DL (ref 0–0.4)
BILIRUB SERPL-MCNC: 1.1 MG/DL (ref 0.2–1.3)
BUN SERPL-MCNC: 9 MG/DL (ref 7–20)
CALCIUM: 10.1 MG/DL (ref 8.4–10.2)
CHLORIDE SERPL-SCNC: 103 MMOL/L (ref 98–107)
CO2 SERPL-SCNC: 22 MMOL/L (ref 22–30)
EOSINOPHIL # BLD AUTO: 0.2 10^3/UL (ref 0–0.6)
EOSINOPHIL NFR BLD AUTO: 1.6 % (ref 0–6)
ERYTHROCYTE [DISTWIDTH] IN BLOOD BY AUTOMATED COUNT: 13.6 % (ref 11.5–14)
GLUCOSE SERPL-MCNC: 91 MG/DL (ref 75–110)
HCT VFR BLD CALC: 40.9 % (ref 36–47)
HGB BLD-MCNC: 14.1 G/DL (ref 12–15.5)
LYMPHOCYTES # BLD AUTO: 2.9 10^3/UL (ref 0.5–4.7)
LYMPHOCYTES NFR BLD AUTO: 21 % (ref 13–45)
MCH RBC QN AUTO: 30 PG (ref 27–33.4)
MCHC RBC AUTO-ENTMCNC: 34.6 G/DL (ref 32–36)
MCV RBC AUTO: 87 FL (ref 80–97)
MONOCYTES # BLD AUTO: 1.1 10^3/UL (ref 0.1–1.4)
MONOCYTES NFR BLD AUTO: 8 % (ref 3–13)
NEUTROPHILS # BLD AUTO: 9.5 10^3/UL (ref 1.7–8.2)
NEUTS SEG NFR BLD AUTO: 69 % (ref 42–78)
PLATELET # BLD: 288 10^3/UL (ref 150–450)
POTASSIUM SERPL-SCNC: 3.7 MMOL/L (ref 3.6–5)
PROT SERPL-MCNC: 8.5 G/DL (ref 6.3–8.2)
RBC # BLD AUTO: 4.71 10^6/UL (ref 3.72–5.28)
TOTAL CELLS COUNTED % (AUTO): 100 %
WBC # BLD AUTO: 13.8 10^3/UL (ref 4–10.5)

## 2020-12-18 NOTE — ER DOCUMENT REPORT
Doctor's Note


Notes: 





12/18/20 01:16


I signed up to see patient but patient walked out prior to my evaluation.  

Patient was seen by several staff members including charge nurse Floresita and her

RN Grace and seems to have had capacity to make that decision however I was unable

to evaluate myself as patient walked out without notifying any staff.